# Patient Record
Sex: FEMALE | Race: WHITE | NOT HISPANIC OR LATINO | Employment: STUDENT | URBAN - METROPOLITAN AREA
[De-identification: names, ages, dates, MRNs, and addresses within clinical notes are randomized per-mention and may not be internally consistent; named-entity substitution may affect disease eponyms.]

---
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
## 2022-02-12 VITALS
BODY MASS INDEX: 23.3 KG/M2 | RESPIRATION RATE: 18 BRPM | OXYGEN SATURATION: 98 % | TEMPERATURE: 100.4 F | WEIGHT: 145 LBS | HEART RATE: 100 BPM | DIASTOLIC BLOOD PRESSURE: 74 MMHG | HEIGHT: 66 IN | SYSTOLIC BLOOD PRESSURE: 128 MMHG

## 2022-02-16 NOTE — NURSING NOTE
Depression Screening Entered On:  2/5/2020 3:30 PM CST    Performed On:  2/5/2020 3:30 PM CST by Nicci Gasca CMA               Depression Screening   Little Interest - Pleasure in Activities :   Several days   Feeling Down, Depressed, Hopeless :   Several days   Initial Depression Screen Score :   2    Trouble Falling or Staying Asleep :   Several days   Feeling Tired or Little Energy :   Several days   Poor Appetite or Overeating :   Several days   Feeling Bad About Yourself :   Several days   Trouble Concentrating :   Several days   Moving or Speaking Slowly :   Not at all   Thoughts Better Off Dead or Hurting Self :   Not at all   Detailed Depression Screen Score :   5    Total Depression Screen Score :   7    NUBIA Difficulty with Work, Home, Others :   Somewhat difficult   Nicci Gasca CMA - 2/5/2020 3:30 PM CST

## 2022-02-16 NOTE — NURSING NOTE
Comprehensive Intake Entered On:  2/5/2020 1:28 PM CST    Performed On:  2/5/2020 1:23 PM CST by Lisa Curran LPN               Summary   Chief Complaint :   cough and congestion for the past two days.    Weight Measured :   145 lb(Converted to: 145 lb 0 oz, 65.77 kg)    Height Measured :   65.5 in(Converted to: 5 ft 5 in, 166.37 cm)    Body Mass Index :   23.76 kg/m2   Body Surface Area :   1.74 m2   Systolic Blood Pressure :   128 mmHg   Diastolic Blood Pressure :   74 mmHg   Mean Arterial Pressure :   92 mmHg   Peripheral Pulse Rate :   100 bpm   BP Site :   Right arm   Pulse Site :   Radial artery   BP Method :   Manual   HR Method :   Manual   Temperature Tympanic :   100.4 DegF(Converted to: 38.0 DegC)    Respiratory Rate :   18 br/min   Oxygen Saturation :   98 %   Lisa Curran LPN - 2/5/2020 1:23 PM CST   Health Status   Allergies Verified? :   Yes   Medication History Verified? :   Yes   Pre-Visit Planning Status :   Completed   Lisa Curran LPN - 2/5/2020 1:23 PM CST   Consents   Consent for Immunization Exchange :   Consent Granted   Consent for Immunizations to Providers :   Consent Granted   Lisa Curran LPN - 2/5/2020 1:23 PM CST   Meds / Allergies   (As Of: 2/5/2020 1:28:57 PM CST)   Allergies (Active)   No Known Medication Allergies  Estimated Onset Date:   Unspecified ; Created By:   Lisa Curran LPN; Reaction Status:   Active ; Category:   Drug ; Substance:   No Known Medication Allergies ; Type:   Allergy ; Updated By:   Lisa Curran LPN; Reviewed Date:   2/5/2020 1:27 PM CST        Medication List   (As Of: 2/5/2020 1:28:57 PM CST)

## 2022-02-16 NOTE — PROGRESS NOTES
Chief Complaint    cough and congestion for the past two days.  History of Present Illness      Patient here for cough and congestion. Cough keeps up at night, no SOB. Started Sunday night. Congestion. Has had some hot flashes. Cough is getting worse a little every day.  Review of Systems      Constitutional:  No fever, No chills, Sweats, No weakness, Fatigue.            Eye:  No recent visual problem.            Ear/Nose/Mouth/Throat:  No decreased hearing, Nasal congestion, sore throat.            Respiratory:  Shortness of breath, Cough, Sputum production.            Cardiovascular:  Negative, No chest pain, No palpitations, No peripheral edema.            Gastrointestinal:  No nausea, No vomiting, No diarrhea, No constipation, No heartburn.            Genitourinary:  No dysuria, No change in urine stream.            Hematology/Lymphatics:  No bruising tendency, No bleeding tendency.            Endocrine:  No cold intolerance, No heat intolerance.            Immunologic:  Negative.            Musculoskeletal:  No back pain, No neck pain, No joint pain, No muscle pain.            Integumentary:  No rash, No dryness, No skin lesion.            Neurologic:  Alert and oriented X4, No headache.            Psychiatric:  No anxiety, No depression.  Physical Exam   Vitals & Measurements    T: 100.4   F (Tympanic)  HR: 100(Peripheral)  RR: 18  BP: 128/74  SpO2: 98%     HT: 65.5 in  WT: 145 lb  BMI: 23.76       General:  Alert and oriented, No acute distress.            Eye: Normal conjunctiva.            HENT:  Tympanic membranes are clear, Oral mucosa is moist, No pharyngeal erythema, Post nasal Drainage.          Neck:  Supple.            Respiratory:                  Respirations: Are within normal limits.                 Breath sounds: No wheezes present.             Cardiovascular:  Normal rate, Regular rhythm, No edema.            Gastrointestinal:  Non-distended.            Musculoskeletal:  Normal gait.             Integumentary:  Warm, No rash.            Psychiatric:  Cooperative, Appropriate mood & affect, Normal judgment.         Assessment/Plan       1. Acute URI (J06.9)        Analgesics and antipyretics as needed, symptomatic care, and follow up if not improving or high fever. Increase fluids and rest. Can take OTC cough suppressants.               I, Lisa Curran LPN, acted solely as a scribe for, and in the presence of Dr. Luis Haywood who performed the services.  Patient Information     Name:HUBERT GRANADOS      Address:      [address] [id number]     Sex:Female     YOB: 2000     [phone]     Emergency Contact:FRANCISCA GRANADOS     MRN:[id number]     FIN:[id number]     Location:[facility]     Date of Service:02/05/2020      Primary Care Physician:       NONE ,       Attending Physician:       Luis Haywood MD, [phone]  Problem List/Past Medical History    Ongoing     No qualifying data    Historical     No qualifying data  Medications   No active medications  Allergies    No Known Medication Allergies

## 2022-02-16 NOTE — NURSING NOTE
CAGE Assessment Entered On:  2/5/2020 3:30 PM CST    Performed On:  2/5/2020 3:30 PM CST by Nicci Gasca CMA               Assessment   Have you ever felt you should cut down on your drinking :   No   Have people annoyed you by criticizing your drinking :   No   Have you ever felt bad or guilty about your drinking :   No   Have you ever taken a drink first thing in the morning to steady your nerves or get rid of a hangover (Eye-opener) :   No   CAGE Score :   0    Nicci Gasca CMA - 2/5/2020 3:30 PM CST

## 2023-02-11 ENCOUNTER — OFFICE VISIT (OUTPATIENT)
Dept: URGENT CARE | Facility: URGENT CARE | Age: 23
End: 2023-02-11
Payer: COMMERCIAL

## 2023-02-11 VITALS
TEMPERATURE: 98 F | DIASTOLIC BLOOD PRESSURE: 68 MMHG | HEART RATE: 83 BPM | SYSTOLIC BLOOD PRESSURE: 107 MMHG | RESPIRATION RATE: 17 BRPM | WEIGHT: 137 LBS | OXYGEN SATURATION: 100 % | BODY MASS INDEX: 21.5 KG/M2 | HEIGHT: 67 IN

## 2023-02-11 DIAGNOSIS — J02.9 PHARYNGITIS, UNSPECIFIED ETIOLOGY: Primary | ICD-10-CM

## 2023-02-11 LAB
DEPRECATED S PYO AG THROAT QL EIA: NEGATIVE
GROUP A STREP BY PCR: NOT DETECTED

## 2023-02-11 PROCEDURE — 87651 STREP A DNA AMP PROBE: CPT

## 2023-02-11 PROCEDURE — 99203 OFFICE O/P NEW LOW 30 MIN: CPT | Performed by: PHYSICIAN ASSISTANT

## 2023-02-11 RX ORDER — NORETHINDRONE ACETATE AND ETHINYL ESTRADIOL 1; 20 MG/1; UG/1
1 TABLET ORAL DAILY
COMMUNITY
Start: 2022-12-17